# Patient Record
Sex: FEMALE | Race: WHITE
[De-identification: names, ages, dates, MRNs, and addresses within clinical notes are randomized per-mention and may not be internally consistent; named-entity substitution may affect disease eponyms.]

---

## 2017-09-26 ENCOUNTER — HOSPITAL ENCOUNTER (OUTPATIENT)
Dept: HOSPITAL 35 - RAD | Age: 37
End: 2017-09-26
Attending: FAMILY MEDICINE
Payer: COMMERCIAL

## 2017-09-26 DIAGNOSIS — Z12.31: Primary | ICD-10-CM

## 2019-03-11 ENCOUNTER — HOSPITAL ENCOUNTER (OUTPATIENT)
Dept: HOSPITAL 35 - MRI | Age: 39
End: 2019-03-11
Attending: PSYCHIATRY & NEUROLOGY
Payer: COMMERCIAL

## 2019-03-11 DIAGNOSIS — M77.11: ICD-10-CM
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
## 2019-03-11 DIAGNOSIS — M25.421: ICD-10-CM
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
## 2019-03-11 DIAGNOSIS — S56.511A: Primary | ICD-10-CM

## 2019-03-11 DIAGNOSIS — R60.0: ICD-10-CM

## 2020-09-26 ENCOUNTER — HOSPITAL ENCOUNTER (INPATIENT)
Dept: HOSPITAL 35 - ER | Age: 40
LOS: 1 days | Discharge: HOME | DRG: 282 | End: 2020-09-27
Attending: FAMILY MEDICINE | Admitting: FAMILY MEDICINE
Payer: COMMERCIAL

## 2020-09-26 VITALS — SYSTOLIC BLOOD PRESSURE: 169 MMHG | DIASTOLIC BLOOD PRESSURE: 102 MMHG

## 2020-09-26 VITALS — WEIGHT: 223 LBS | BODY MASS INDEX: 42.1 KG/M2 | HEIGHT: 60.98 IN

## 2020-09-26 VITALS — SYSTOLIC BLOOD PRESSURE: 147 MMHG | DIASTOLIC BLOOD PRESSURE: 89 MMHG

## 2020-09-26 DIAGNOSIS — Z72.89: ICD-10-CM

## 2020-09-26 DIAGNOSIS — I16.0: ICD-10-CM

## 2020-09-26 DIAGNOSIS — Z82.49: ICD-10-CM

## 2020-09-26 DIAGNOSIS — E78.00: ICD-10-CM

## 2020-09-26 DIAGNOSIS — I21.4: Primary | ICD-10-CM

## 2020-09-26 DIAGNOSIS — F32.9: ICD-10-CM

## 2020-09-26 DIAGNOSIS — Z79.899: ICD-10-CM

## 2020-09-26 DIAGNOSIS — Z87.891: ICD-10-CM

## 2020-09-26 DIAGNOSIS — R79.89: ICD-10-CM

## 2020-09-26 DIAGNOSIS — Z20.828: ICD-10-CM

## 2020-09-26 DIAGNOSIS — I10: ICD-10-CM

## 2020-09-26 DIAGNOSIS — F90.9: ICD-10-CM

## 2020-09-26 DIAGNOSIS — F41.9: ICD-10-CM

## 2020-09-26 LAB
ALBUMIN SERPL-MCNC: 4 G/DL (ref 3.4–5)
ALT SERPL-CCNC: 29 U/L (ref 30–65)
ANION GAP SERPL CALC-SCNC: 16 MMOL/L (ref 7–16)
AST SERPL-CCNC: 22 U/L (ref 15–37)
BASOPHILS NFR BLD AUTO: 0.7 % (ref 0–2)
BILIRUB SERPL-MCNC: 0.4 MG/DL (ref 0.2–1)
BUN SERPL-MCNC: 11 MG/DL (ref 7–18)
CALCIUM SERPL-MCNC: 9 MG/DL (ref 8.5–10.1)
CHLORIDE SERPL-SCNC: 102 MMOL/L (ref 98–107)
CO2 SERPL-SCNC: 20 MMOL/L (ref 21–32)
CREAT SERPL-MCNC: 0.8 MG/DL (ref 0.6–1)
EOSINOPHIL NFR BLD: 0.5 % (ref 0–3)
ERYTHROCYTE [DISTWIDTH] IN BLOOD BY AUTOMATED COUNT: 13 % (ref 10.5–14.5)
GLUCOSE SERPL-MCNC: 123 MG/DL (ref 74–106)
GRANULOCYTES NFR BLD MANUAL: 66.3 % (ref 36–66)
HCT VFR BLD CALC: 44 % (ref 37–47)
HGB BLD-MCNC: 14.9 GM/DL (ref 12–15)
INR PPP: 1
LYMPHOCYTES NFR BLD AUTO: 25 % (ref 24–44)
MAGNESIUM SERPL-MCNC: 1.7 MG/DL (ref 1.8–2.4)
MCH RBC QN AUTO: 30.2 PG (ref 26–34)
MCHC RBC AUTO-ENTMCNC: 33.9 G/DL (ref 28–37)
MCV RBC: 89 FL (ref 80–100)
MONOCYTES NFR BLD: 7.5 % (ref 1–8)
NEUTROPHILS # BLD: 8 THOU/UL (ref 1.4–8.2)
PLATELET # BLD: 385 THOU/UL (ref 150–400)
POTASSIUM SERPL-SCNC: 3.4 MMOL/L (ref 3.5–5.1)
PROT SERPL-MCNC: 8.6 G/DL (ref 6.4–8.2)
PROTHROMBIN TIME: 10.2 SECONDS (ref 9.3–11.4)
RBC # BLD AUTO: 4.95 MIL/UL (ref 4.2–5)
SODIUM SERPL-SCNC: 138 MMOL/L (ref 136–145)
TROPONIN I SERPL-MCNC: 0.27 NG/ML (ref ?–0.06)
WBC # BLD AUTO: 12.1 THOU/UL (ref 4–11)

## 2020-09-26 PROCEDURE — B2151ZZ FLUOROSCOPY OF LEFT HEART USING LOW OSMOLAR CONTRAST: ICD-10-PCS | Performed by: INTERNAL MEDICINE

## 2020-09-26 PROCEDURE — B2111ZZ FLUOROSCOPY OF MULTIPLE CORONARY ARTERIES USING LOW OSMOLAR CONTRAST: ICD-10-PCS | Performed by: INTERNAL MEDICINE

## 2020-09-26 PROCEDURE — 4A023N7 MEASUREMENT OF CARDIAC SAMPLING AND PRESSURE, LEFT HEART, PERCUTANEOUS APPROACH: ICD-10-PCS | Performed by: INTERNAL MEDICINE

## 2020-09-27 VITALS — SYSTOLIC BLOOD PRESSURE: 160 MMHG | DIASTOLIC BLOOD PRESSURE: 111 MMHG

## 2020-09-27 VITALS — SYSTOLIC BLOOD PRESSURE: 150 MMHG | DIASTOLIC BLOOD PRESSURE: 92 MMHG

## 2020-09-27 VITALS — DIASTOLIC BLOOD PRESSURE: 117 MMHG | SYSTOLIC BLOOD PRESSURE: 162 MMHG

## 2020-09-27 VITALS — DIASTOLIC BLOOD PRESSURE: 95 MMHG | SYSTOLIC BLOOD PRESSURE: 150 MMHG

## 2020-09-27 VITALS — SYSTOLIC BLOOD PRESSURE: 166 MMHG | DIASTOLIC BLOOD PRESSURE: 101 MMHG

## 2020-09-27 VITALS — DIASTOLIC BLOOD PRESSURE: 101 MMHG | SYSTOLIC BLOOD PRESSURE: 169 MMHG

## 2020-09-27 VITALS — SYSTOLIC BLOOD PRESSURE: 174 MMHG | DIASTOLIC BLOOD PRESSURE: 107 MMHG

## 2020-09-27 VITALS — SYSTOLIC BLOOD PRESSURE: 165 MMHG | DIASTOLIC BLOOD PRESSURE: 87 MMHG

## 2020-09-27 VITALS — DIASTOLIC BLOOD PRESSURE: 98 MMHG | SYSTOLIC BLOOD PRESSURE: 169 MMHG

## 2020-09-27 LAB
ANION GAP SERPL CALC-SCNC: 13 MMOL/L (ref 7–16)
BUN SERPL-MCNC: 9 MG/DL (ref 7–18)
CALCIUM SERPL-MCNC: 8.6 MG/DL (ref 8.5–10.1)
CHLORIDE SERPL-SCNC: 104 MMOL/L (ref 98–107)
CO2 SERPL-SCNC: 20 MMOL/L (ref 21–32)
CREAT SERPL-MCNC: 0.7 MG/DL (ref 0.6–1)
ERYTHROCYTE [DISTWIDTH] IN BLOOD BY AUTOMATED COUNT: 12.8 % (ref 10.5–14.5)
GLUCOSE SERPL-MCNC: 103 MG/DL (ref 74–106)
HCT VFR BLD CALC: 39.9 % (ref 37–47)
HGB BLD-MCNC: 13.8 GM/DL (ref 12–15)
MAGNESIUM SERPL-MCNC: 1.8 MG/DL (ref 1.8–2.4)
MCH RBC QN AUTO: 30.5 PG (ref 26–34)
MCHC RBC AUTO-ENTMCNC: 34.5 G/DL (ref 28–37)
MCV RBC: 88.6 FL (ref 80–100)
PLATELET # BLD: 326 THOU/UL (ref 150–400)
POTASSIUM SERPL-SCNC: 3.2 MMOL/L (ref 3.5–5.1)
RBC # BLD AUTO: 4.5 MIL/UL (ref 4.2–5)
SODIUM SERPL-SCNC: 137 MMOL/L (ref 136–145)
TROPONIN I SERPL-MCNC: 0.25 NG/ML (ref ?–0.06)
WBC # BLD AUTO: 10.9 THOU/UL (ref 4–11)

## 2020-09-27 NOTE — NUR
PT PRESENTED TO HOSPITAL WITH CHEST PAIN.ADMITTED TO  S/P CARDIAC CATH
W/O INTERVENTIONS.RIGHT GROIN DRESSING CDI,MYNX,NO HEMATOMA OR ACTIVE
BLEEDING.PERIPHERAL PULSES PRESENT 2+.NO EDEMA NOTED TO RAJ LEGS.ON BEDREST
FOR 3 HRS.IVF INFUSING AS PER ORDER.PT HYPERTENSIVE W/SBP >160.DR SPICER
NOTIFIED ORDERS GIVEN SEE MAR.NSR ON MONITOR.PT REPORTED MILD CHEST
DISCOMFORT WITH SLIGHT HEADCAHE.DECLINED TO TAKE PAIN MEDS.ADMISSION
ASSESSMENT COMPLETED AS DOCUMENTED.PT DENIES ANY OTHER CONCERNS AT THIS
TIME.WILL CONTINUE W/POC.

## 2020-09-27 NOTE — NUR
ASSESSMENT AS CHARTED - MEDS AS PER MAR - PT GIVEN K+/ COREG AND XANEX AS
ORDERED.  XANEX WITH GOOD CLAMING RELIEF FOR PATIENT - STATED SHE FELT HALF OF
THE SODE WOULD PROBABLY BE ENOUGH IN THE FUTURE.  UP AD EVAN IN ROOM.  TIMOTHY DIET
AND FLUIDS.  NO CO'S OF PAIN OR NAUSEA. GROIN SITE C/D/I.  PT HOME THIS
AFTERNOON - INSTRUCTION RE HOME MEDS/ CARE AND FOLLOW UP GIVEN TO PATIENT.
LEFT UNIT AMBULATORY - HOME VIS PVT VEHICLE ACCOMAPNIED BY SISTER.  NO CO'S AT
TIME OF D/C.

## 2020-09-27 NOTE — CATHLAB
[facility]
[address]
[place]   [address]                   INVASIVE PROCEDURE REPORT     
_______________________________________________________________________________
 
Name:       SONALI CARPIO            Room #:         211-P       ADM IN  
M.R.#:      [id number]                       Account #:      [id number]  
Admission:  09/26/20    Attend Phys:    Giovanni Rosario MD  
Discharge:              Date of Birth:  [birth year]  
                                                          Report #: [id number]
                                                                    [id number]
_______________________________________________________________________________
THIS REPORT FOR:  
 
cc:  Giovanni Rosario MD, Neal A. MD Park, Jin S. MD                                                   
                                                                       ~
 
--------------- APPROVED REPORT --------------
 
 
Study performed:  09/26/2020 22:41:19
 
Patient Details
Patient Status: ED                  Room #: 
The patient is a 40 year-old female
 
Event Personnel
Ar Munoz  Interventional Cardiologist, Aubrie Boyer RN RN, 
Jenny Tran RTR, RCIS Monitor, Gracie Epps RTR 
Scrub
 
Procedures Performed
Art Access - R femoral artery*  Left Heart Cath w/or w/o Coronaries 
[id number] [facility] Hemostasis w/ Mynx
 
Indication
Non-STEMI , Dyspnea, Chest pain
 
Risk Factors
Hypercholesterolemia, Hypertension
 
Procedure Narrative
The Right Groin^ was infiltrated with 1% Lidocaine subcutaneous 
anesthesia. A PINNACLE 5FR Sheath #[id number] sheath was inserted into 
the RFA^. Coronary angiography was performed using coronary 
diagnostic catheters. The right coronary system was accessed and 
visualized with a JR4 catheter. The left coronary system was accessed 
and visualized with a JL4 catheter. The left ventricle was accessed 
and visualized with a angled pigtail catheter. Left 
ventricular/Aortic Valve gradient assessed via catheter pullback. 
Left ventriculogram was performed in 30 degree projection. 
Pre-demployment femoral angiogram was performed . Closure device was 
deployed with a 5 Fr MYNXGRIP 5F #[id number]. The patient tolerated the 
procedure well and there were no complications associated with the 
procedure. There was no hematoma.
 
 
 
[facility]
[address]
[place]  [address]
Phone:  [phone]                    INVASIVE PROCEDURE REPORT     
_______________________________________________________________________________
 
Name:            SONALI CARPIO            Room #:        211-P       [facility] [place]
..#:           [id number]          Account #:     [id number]  
Admission:       09/26/20         Attend Phys:   Giovanni Rosario, 
Discharge:                  Date of Birth: [birth year]  
                         Report #:      [id number]
        [id number]
_______________________________________________________________________________
Intraoperative Conscious Sedation
No conscious sedation used.   
 
Fluoro Time:    1.54 minutes    
Dose:     DAP 5301.30 cGycm2  573 mGy  
Contrast Type and Amount:  Omnipaque 95 ml    
 
Diagnostic Cath
Left Main Left main artery is a large-caliber vessel, appears 
angiographically normal.
LAD  The LAD is a moderate size caliber vessel, traverses the 
anterior wall and terminates at the apex.  This vessel is patent with 
no flow-limiting lesions.
Diagonal 1 This is a small to moderate size caliber vessel, patent 
with no flow-limiting lesions.
Diagonal 2 This is a small to moderate size caliber vessel, patent 
with no flow-limiting lesions.
Circumflex Left circumflex artery is a codominant vessel, appears 
angiographically normal.
OM1  This is a moderate-sized caliber vessel, with no flow-limiting 
lesions.
OM2  This is a moderate-sized caliber vessel, with no flow-limiting 
lesions.
OM3  This is a moderate-sized caliber vessel, with no flow-limiting 
lesions.
Right Coronary The RCA is a moderate-sized caliber vessel, appears 
angiographically normal.
R PDA  This is a moderate-sized caliber vessel, with no flow-limiting 
lesions.
 
Left Ventriculography
The left ventricle is normal in size with Mildly diminished 
contractility. The left ventricular ejection fraction is estimated to 
be 45%. Left ventricular wall motion abnormalities are present. Mild 
hypokinesis of the inferior wall.
 
Hemodynamics
The aortic pressure is 179/114 mmHg with a mean of 141 mmHg. The left 
ventricular pressure is 179/12 mmHg with a mean of mmHg. The left 
ventricular end diastolic pressure is 14 mmHg. 
 
Conclusion
1.  Angiographically normal coronary arteries.
2.  Codominant system.
3.  Mild segmental LV dysfunction, with subtle hypokinesis 
inferiorly.
 
 
[facility]
[address]
[place]  [address]
Phone:  [phone]                    INVASIVE PROCEDURE REPORT     
_______________________________________________________________________________
 
Name:            SONALI CARPIO            Room #:        211-P       [facility] [place]
.R.#:           [id number]          Account #:     [id number]  
Admission:       09/26/20         Attend Phys:   Giovanni Rosario, 
Discharge:                  Date of Birth: [birth year]  
                         Report #:      [id number]
        [id number]
_______________________________________________________________________________
4.  Recommend guideline directed medical therapy and risk factor 
management.
 
 
 
 
 
 
 
 
 
 
 
 
 
 
 
 
 
 
 
 
 
 
 
 
 
 
 
 
 
 
 
 
 
 
 
 
 
 
 
 
 
 
  <ELECTRONICALLY SIGNED>
   By: Ar Munoz MD               
  09/27/20 0827
D: 09/27/20 0827                           _____________________________________
T: 09/27/20 0827                           Ar Munoz MD                 /INF

## 2020-09-27 NOTE — HC
[facility]
[address]
[place]   [address]                     CONSULTATION                  
_______________________________________________________________________________
 
Name:       SONALI CARPIO            Room #:         211-P       ADM IN  
M.R.#:      [id number]                       Account #:      [id number]  
Admission:  09/26/20    Attend Phys:    Giovanni Rosario MD  
Discharge:              Date of Birth:  [birth year]  
                                                          Report #: [id number]
                                                                    [id number]   
_______________________________________________________________________________
THIS REPORT FOR:  
 
cc:  Giovanni Rosario MD, Neal A. MD Park, Jin S. MD                                               ~
CC: Ar Rosario
 
DATE OF SERVICE:  09/26/2020
 
 
INDICATION:  Chest pain.
 
HISTORY OF PRESENT ILLNESS:  This is a 40-year-old female with a history of
hypertension, ADHD, anxiety disorder, presenting with chest pain and shortness
of breath.  About 1 hour prior to presentation, she developed substernal chest
pressure that have originated on the left side of her neck.  She was cleaning
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  She felt diaphoretic and mildly
dyspneic.  She rested, took some nitroglycerin.  She also took aspirin, with
partial relief of her symptoms.  There is no history of fever, chills,
orthopnea.  She does report increased stress lately.  In the ER, ECG reveals
sinus rhythm with LVH and nonspecific T-wave inversions.  Initial troponin is
positive at 0.27.  The pain is still persistent.
 
PAST MEDICAL HISTORY:  Hypertension, ADHD, and anxiety disorder.
 
ALLERGIES:  None.
 
MEDICATIONS:  At home include Adderall 1 tablet twice a day, benazepril 40 mg
daily, estradiol vaginal ring and Celexa.
 
SOCIAL HISTORY:  Denies tobacco use.
 
FAMILY HISTORY:  Negative for premature CAD.
 
REVIEW OF SYSTEMS:  A full 10-point review of systems performed.  Only the
pertinent positives and negatives are described in the HPI.
 
PHYSICAL EXAMINATION:
VITAL SIGNS:  Blood pressure is 160/80, heart rate is 80 beats per minute.
GENERAL APPEARANCE:  This is a mildly overweight female, in no acute distress.
HEENT:  Normocephalic, atraumatic.  Oral mucosa is moist.
NECK:  Supple.
LUNGS:  Clear to auscultation.
CARDIAC:  Regular rate and rhythm, S1, S2 positive.
ABDOMEN:  Soft, nontender.
 
 
 
[facility]
[address]
[place]   [address]                     CONSULTATION                  
_______________________________________________________________________________
 
Name:       SHIRINSONALI QUIROSBETH            Room #:         211-P       ADM IN  
.R.#:      [id number]                       Account #:      [id number]  
Admission:  09/26/20    Attend Phys:    Giovanni Rosario MD  
Discharge:              Date of Birth:  [birth year]  
                                                          Report #: [id number]
                                                                    [id number]   
_______________________________________________________________________________
EXTREMITIES:  No cyanosis, no edema.
 
DIAGNOSTIC STUDIES:  ECG reveals sinus rhythm, LVH with nonspecific ST-segment
abnormalities.
 
LABORATORY VALUES:  White count 12.1, hemoglobin is 14.9, creatinine 0.8 and
troponin 0.27.
 
ASSESSMENT AND PLAN:
1.  Non-ST elevation myocardial infarction, the patient with persistent chest
pain and dyspnea.  Her symptoms and findings are consistent with an acute
coronary syndrome.  Since the pain is continuous, I discussed with her the
risks, benefits and alternatives to have a cardiac catheterization.  She voices
understanding and wishes to proceed.  Start aspirin therapy.
2.  Hypertension, continue with ACE inhibitor and add a beta-blocker.
3.  Hypercholesterolemia, start statin therapy.
4.  Anxiety disorder, continue with Celexa.
 
 
 
 
 
 
 
 
 
 
 
 
 
 
 
 
 
 
 
 
 
 
 
 
 
 
 
  <ELECTRONICALLY SIGNED>
   By: Ar Munoz MD               
  09/27/20     0751
D: 09/26/20 2220                           _____________________________________
T: 09/26/20 2305                           Ar Munoz MD                 /nt

## 2020-09-28 NOTE — EKG
[facility]
[place]   [address]                     ELECTROCARDIOGRAM REPORT      
_______________________________________________________________________________
 
Name:       SONALI CARPIOZABETH            Room #:         211-P       DIS IN  
M.R.#:      [id number]                       Account #:      [id number]  
Admission:  20    Attend Phys:    Giovanni Rosario MD  
Discharge:  20    Date of Birth:  [birth year]  
                                                          Report #: [id number]
                                                                    [id number]
_______________________________________________________________________________
THIS REPORT FOR:  
 
cc:  Giovanni Rosario MD, Neal A. MD Santiago, Patrick MD [facility]                                         ~
THIS REPORT FOR:   //name//                          
 
                         [facility] ED
                                       
Test Date:    2020               Test Time:    21:55:35
Pat Name:     SONALI CARPIO                 Department:   
Patient ID:   [id number]            Room:         211 P
Gender:       F                        Technician:   leann
:          1980               Requested By: James Pham
Order Number: [id number] MD:   Hugo John
                                 Measurements
Intervals                              Axis          
Rate:         83                       P:            21
OR:           155                      QRS:          -31
QRSD:         103                      T:            37
QT:           372                                    
QTc:          437                                    
                           Interpretive Statements
Sinus rhythm
LVH with secondary repolarization abnormality
Compared to ECG 2020 20:57:02
No significant changes
Electronically Signed On 2020 8:13:00 CDT by Hugo John
[email or web address]
 
 
 
 
 
 
 
 
 
 
 
 
 
 
 
  <ELECTRONICALLY SIGNED>
   By: Hugo John MD, FAC    
  2013
D: 20                           _____________________________________
T: 20                           Hugo John MD, [facility]      /EPI

## 2020-09-28 NOTE — EKG
[facility]
[place]   [address]                     ELECTROCARDIOGRAM REPORT      
_______________________________________________________________________________
 
Name:       SONALI CARPIO            Room #:         211-       DIS IN  
M.R.#:      [id number]                       Account #:      [id number]  
Admission:  20    Attend Phys:    Giovanni Rosario MD  
Discharge:  20    Date of Birth:  [birth year]  
                                                          Report #: [id number]
                                                                    [id number]
_______________________________________________________________________________
THIS REPORT FOR:  
 
cc:  Giovanni Rosario MD, Neal A. MD Santiago, Patrick MD [facility]                                         ~
THIS REPORT FOR:   //name//                          
 
                         [facility] ED
                                       
Test Date:    2020               Test Time:    20:57:02
Pat Name:     SONALI CARPIO                 Department:   
Patient ID:   [id number]            Room:         211
Gender:       F                        Technician:   sherry adams
:          1980               Requested By: James Pham
Order Number: [id number] MD:   Hugo John
                                 Measurements
Intervals                              Axis          
Rate:         99                       P:            -9
ME:           150                      QRS:          -27
QRSD:         95                       T:            74
QT:           371                                    
QTc:          477                                    
                           Interpretive Statements
Sinus rhythm
LVH with secondary repolarization abnormality
No previous ECG available for comparison
Electronically Signed On 2020 8:12:57 CDT by Hugo John
[email or web address]
 
 
 
 
 
 
 
 
 
 
 
 
 
 
 
 
  <ELECTRONICALLY SIGNED>
   By: Hugo John MD, FAC    
  20
D: 20                           _____________________________________
T: 20                           Hugo John MD, [facility]      /EPI

## 2020-10-15 ENCOUNTER — HOSPITAL ENCOUNTER (OUTPATIENT)
Dept: HOSPITAL 35 - SJCVCIMAG | Age: 40
End: 2020-10-15
Attending: INTERNAL MEDICINE
Payer: COMMERCIAL

## 2020-10-15 DIAGNOSIS — Z87.891: ICD-10-CM

## 2020-10-15 DIAGNOSIS — I10: ICD-10-CM

## 2020-10-15 DIAGNOSIS — I08.1: Primary | ICD-10-CM

## 2020-11-04 ENCOUNTER — HOSPITAL ENCOUNTER (EMERGENCY)
Dept: HOSPITAL 35 - ER | Age: 40
Discharge: HOME | End: 2020-11-04
Payer: COMMERCIAL

## 2020-11-04 VITALS — DIASTOLIC BLOOD PRESSURE: 102 MMHG | SYSTOLIC BLOOD PRESSURE: 167 MMHG

## 2020-11-04 VITALS — BODY MASS INDEX: 40.85 KG/M2 | WEIGHT: 222.01 LBS | HEIGHT: 62 IN

## 2020-11-04 DIAGNOSIS — U07.1: Primary | ICD-10-CM

## 2020-11-04 DIAGNOSIS — Z79.899: ICD-10-CM

## 2020-11-04 DIAGNOSIS — I10: ICD-10-CM

## 2020-11-24 ENCOUNTER — HOSPITAL ENCOUNTER (OUTPATIENT)
Dept: HOSPITAL 35 - RAD | Age: 40
End: 2020-11-24
Attending: FAMILY MEDICINE
Payer: COMMERCIAL

## 2020-11-24 DIAGNOSIS — R06.00: Primary | ICD-10-CM

## 2021-05-20 ENCOUNTER — HOSPITAL ENCOUNTER (OUTPATIENT)
Dept: HOSPITAL 35 - LAB | Age: 41
End: 2021-05-20
Attending: FAMILY MEDICINE
Payer: COMMERCIAL

## 2021-05-20 DIAGNOSIS — F41.9: Primary | ICD-10-CM

## 2021-05-20 DIAGNOSIS — R53.83: ICD-10-CM

## 2021-05-20 LAB
ALBUMIN SERPL-MCNC: 3.3 G/DL (ref 3.4–5)
ALT SERPL-CCNC: 46 U/L (ref 30–65)
ANION GAP SERPL CALC-SCNC: 13 MMOL/L (ref 7–16)
AST SERPL-CCNC: 38 U/L (ref 15–37)
BACTERIA-REFLEX: (no result) /HPF
BASOPHILS NFR BLD AUTO: 0.7 % (ref 0–2)
BILIRUB SERPL-MCNC: 0.4 MG/DL (ref 0.2–1)
BILIRUB UR-MCNC: NEGATIVE MG/DL
BUN SERPL-MCNC: 15 MG/DL (ref 7–18)
CALCIUM SERPL-MCNC: 9.1 MG/DL (ref 8.5–10.1)
CHLORIDE SERPL-SCNC: 102 MMOL/L (ref 98–107)
CHOLEST SERPL-MCNC: 214 MG/DL (ref ?–200)
CO2 SERPL-SCNC: 24 MMOL/L (ref 21–32)
COLOR UR: YELLOW
CORTISOL RANDOM: 9 UG/DL
CREAT SERPL-MCNC: 0.8 MG/DL (ref 0.6–1)
EOSINOPHIL NFR BLD: 5.8 % (ref 0–3)
ERYTHROCYTE [DISTWIDTH] IN BLOOD BY AUTOMATED COUNT: 13 % (ref 10.5–14.5)
GLUCOSE SERPL-MCNC: 172 MG/DL (ref 74–106)
GRANULOCYTES NFR BLD MANUAL: 51 % (ref 36–66)
HCT VFR BLD CALC: 40.3 % (ref 37–47)
HDLC SERPL-MCNC: 38 MG/DL (ref 40–?)
HGB BLD-MCNC: 14 GM/DL (ref 12–15)
KETONES UR STRIP-MCNC: NEGATIVE MG/DL
LDLC SERPL-MCNC: 114 MG/DL (ref ?–100)
LYMPHOCYTES NFR BLD AUTO: 34.1 % (ref 24–44)
MCH RBC QN AUTO: 31.1 PG (ref 26–34)
MCHC RBC AUTO-ENTMCNC: 34.7 G/DL (ref 28–37)
MCV RBC: 89.6 FL (ref 80–100)
MONOCYTES NFR BLD: 8.4 % (ref 1–8)
MUCUS: (no result) STRN/LPF
NEUTROPHILS # BLD: 4.9 THOU/UL (ref 1.4–8.2)
PLATELET # BLD: 330 THOU/UL (ref 150–400)
POTASSIUM SERPL-SCNC: 3.6 MMOL/L (ref 3.5–5.1)
PROT SERPL-MCNC: 7.4 G/DL (ref 6.4–8.2)
RBC # BLD AUTO: 4.5 MIL/UL (ref 4.2–5)
RBC # UR STRIP: (no result) /UL
RBC #/AREA URNS HPF: (no result) /HPF
SODIUM SERPL-SCNC: 139 MMOL/L (ref 136–145)
SP GR UR STRIP: >= 1.03 (ref 1–1.03)
SQUAMOUS: (no result) /LPF (ref 0–3)
T4 (THYROXINE): 11.7 UG/DL (ref 4.5–12)
TC:HDL: 5.6 RATIO
TRIGL SERPL-MCNC: 310 MG/DL (ref ?–150)
URINE CLARITY: CLEAR
URINE GLUCOSE-RANDOM*: NEGATIVE
URINE LEUKOCYTES-REFLEX: NEGATIVE
URINE NITRITE-REFLEX: NEGATIVE
URINE PROTEIN (DIPSTICK): (no result)
URINE WBC-REFLEX: (no result) /HPF (ref 0–5)
UROBILINOGEN UR STRIP-ACNC: 0.2 E.U./DL (ref 0.2–1)
VLDLC SERPL CALC-MCNC: 62 MG/DL (ref ?–40)
WBC # BLD AUTO: 9.6 THOU/UL (ref 4–11)

## 2021-05-21 LAB
EST. AVERAGE GLUCOSE BLD GHB EST-MCNC: 128 MG/DL
GLYCOHEMOGLOBIN (HGB A1C): 6.1 % (ref 4.8–5.6)

## 2021-10-15 ENCOUNTER — HOSPITAL ENCOUNTER (OUTPATIENT)
Dept: HOSPITAL 35 - SJCVCIMAG | Age: 41
End: 2021-10-15
Attending: INTERNAL MEDICINE
Payer: COMMERCIAL

## 2021-10-15 DIAGNOSIS — Z95.818: ICD-10-CM

## 2021-10-15 DIAGNOSIS — Z87.891: ICD-10-CM

## 2021-10-15 DIAGNOSIS — Z79.82: ICD-10-CM

## 2021-10-15 DIAGNOSIS — I34.0: Primary | ICD-10-CM

## 2021-10-15 DIAGNOSIS — E78.5: ICD-10-CM

## 2021-10-15 DIAGNOSIS — Z79.899: ICD-10-CM

## 2021-10-15 DIAGNOSIS — Z82.49: ICD-10-CM

## 2021-10-15 DIAGNOSIS — F41.9: ICD-10-CM

## 2021-10-15 DIAGNOSIS — I51.4: ICD-10-CM

## 2021-10-15 DIAGNOSIS — I10: ICD-10-CM

## 2021-10-15 DIAGNOSIS — R60.9: ICD-10-CM
